# Patient Record
Sex: FEMALE | Race: WHITE | HISPANIC OR LATINO
[De-identification: names, ages, dates, MRNs, and addresses within clinical notes are randomized per-mention and may not be internally consistent; named-entity substitution may affect disease eponyms.]

---

## 2019-04-18 ENCOUNTER — TRANSCRIPTION ENCOUNTER (OUTPATIENT)
Age: 59
End: 2019-04-18

## 2023-03-28 ENCOUNTER — EMERGENCY (EMERGENCY)
Facility: HOSPITAL | Age: 63
LOS: 1 days | Discharge: HOME | End: 2023-03-28
Attending: EMERGENCY MEDICINE
Payer: COMMERCIAL

## 2023-03-28 VITALS
HEIGHT: 57 IN | HEART RATE: 108 BPM | RESPIRATION RATE: 18 BRPM | SYSTOLIC BLOOD PRESSURE: 119 MMHG | WEIGHT: 169.98 LBS | DIASTOLIC BLOOD PRESSURE: 81 MMHG | TEMPERATURE: 98 F | OXYGEN SATURATION: 97 %

## 2023-03-28 PROCEDURE — T1013: CPT

## 2023-03-28 PROCEDURE — 99282 EMERGENCY DEPT VISIT SF MDM: CPT

## 2023-03-28 PROCEDURE — 99284 EMERGENCY DEPT VISIT MOD MDM: CPT
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
## 2023-03-28 NOTE — ED STATDOCS - TEST CONSIDERED BUT NOT PERFORMED
Tests Considered But Not Performed CT head: no LOC, no vomiting, no anticoagulation, normal neuro exam, no external signs of trauma
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
## 2023-03-28 NOTE — ED STATDOCS - NEUROLOGICAL, MLM
neuro: CN II - XII grossly intact, EOMI, PERRL, 5/5 muscle strength x 4 extremities, no sensory deficits, dtr grossly intact, no ataxic gait, normal KALPANA and FNT, normal romberg

## 2023-03-28 NOTE — ED STATDOCS - PATIENT PORTAL LINK FT
You can access the FollowMyHealth Patient Portal offered by [facility] by registering at the following website: [email or web address]. By joining [facility]’s FollowMyHealth portal, you will also be able to view your health information using other applications (apps) compatible with our system.